# Patient Record
Sex: MALE | Race: WHITE | NOT HISPANIC OR LATINO
[De-identification: names, ages, dates, MRNs, and addresses within clinical notes are randomized per-mention and may not be internally consistent; named-entity substitution may affect disease eponyms.]

---

## 2023-03-30 ENCOUNTER — NON-APPOINTMENT (OUTPATIENT)
Age: 68
End: 2023-03-30

## 2023-04-04 ENCOUNTER — APPOINTMENT (OUTPATIENT)
Dept: DERMATOLOGY | Facility: CLINIC | Age: 68
End: 2023-04-04
Payer: MEDICARE

## 2023-04-04 ENCOUNTER — NON-APPOINTMENT (OUTPATIENT)
Age: 68
End: 2023-04-04

## 2023-04-04 DIAGNOSIS — L81.4 OTHER MELANIN HYPERPIGMENTATION: ICD-10-CM

## 2023-04-04 DIAGNOSIS — B00.9 HERPESVIRAL INFECTION, UNSPECIFIED: ICD-10-CM

## 2023-04-04 DIAGNOSIS — L82.1 OTHER SEBORRHEIC KERATOSIS: ICD-10-CM

## 2023-04-04 DIAGNOSIS — L82.0 INFLAMED SEBORRHEIC KERATOSIS: ICD-10-CM

## 2023-04-04 DIAGNOSIS — Z00.00 ENCOUNTER FOR GENERAL ADULT MEDICAL EXAMINATION W/OUT ABNORMAL FINDINGS: ICD-10-CM

## 2023-04-04 PROCEDURE — 99203 OFFICE O/P NEW LOW 30 MIN: CPT | Mod: 25

## 2023-04-04 PROCEDURE — 17110 DESTRUCTION B9 LES UP TO 14: CPT

## 2023-04-04 RX ORDER — VALACYCLOVIR 500 MG/1
500 TABLET, FILM COATED ORAL TWICE DAILY
Qty: 1 | Refills: 4 | Status: ACTIVE | COMMUNITY
Start: 2023-04-04 | End: 1900-01-01

## 2023-04-04 NOTE — HISTORY OF PRESENT ILLNESS
[FreeTextEntry1] : Patient presents for skin examination. [de-identified] : Notes irritated lesions of the right lower eyelid and left cheek.

## 2023-04-04 NOTE — PHYSICAL EXAM
[Alert] : alert [Oriented x 3] : ~L oriented x 3 [Well Nourished] : well nourished [Full Body Skin Exam Performed] : performed [FreeTextEntry3] : A full skin exam was performed including the scalp, face (including lips, ears, nose and eyes), neck, chest, abdomen, back, buttocks, upper extremities and lower extremities.  The patient declined examination of the genitalia.  \par The exam revealed the following benign growths:\par Seborrheic keratoses - numerous, expecially on the trunk.\par Lentigines - face.\par \par Greasy brown erythematous papules, right lower eyelid and left lateral cheek.\par \par Clustered vesicles on erythematous patch, left upper buttocks region.\par

## 2023-04-04 NOTE — ASSESSMENT
[FreeTextEntry1] : A complete skin examination was performed.  There is no evidence of skin cancer.  We discussed the importance of photoprotection, including the use of hats, protective clothing and sunscreens with an SPF of at least 30.  Sun avoidance was also discussed.  The ABCDE's of melanoma was discussed.  Regular skin exams recommended.\par \par H.simplex\par education.\par Valtrex 500mg bid x 3 days per flare.

## 2023-06-10 ENCOUNTER — INPATIENT (INPATIENT)
Facility: HOSPITAL | Age: 68
LOS: 2 days | Discharge: HOME | DRG: 247 | End: 2023-06-13
Attending: HOSPITALIST | Admitting: INTERNAL MEDICINE
Payer: MEDICARE

## 2023-06-10 VITALS — HEIGHT: 71 IN | WEIGHT: 169.98 LBS

## 2023-06-10 DIAGNOSIS — Z91.199 PATIENT'S NONCOMPLIANCE WITH OTHER MEDICAL TREATMENT AND REGIMEN DUE TO UNSPECIFIED REASON: ICD-10-CM

## 2023-06-10 DIAGNOSIS — I10 ESSENTIAL (PRIMARY) HYPERTENSION: ICD-10-CM

## 2023-06-10 DIAGNOSIS — I21.4 NON-ST ELEVATION (NSTEMI) MYOCARDIAL INFARCTION: ICD-10-CM

## 2023-06-10 DIAGNOSIS — I25.10 ATHEROSCLEROTIC HEART DISEASE OF NATIVE CORONARY ARTERY WITHOUT ANGINA PECTORIS: ICD-10-CM

## 2023-06-10 DIAGNOSIS — E78.5 HYPERLIPIDEMIA, UNSPECIFIED: ICD-10-CM

## 2023-06-10 DIAGNOSIS — R07.9 CHEST PAIN, UNSPECIFIED: ICD-10-CM

## 2023-06-10 LAB
ADD ON TEST-SPECIMEN IN LAB: SIGNIFICANT CHANGE UP
ALBUMIN SERPL ELPH-MCNC: 3.3 G/DL — SIGNIFICANT CHANGE UP (ref 3.3–5)
ALP SERPL-CCNC: 65 U/L — SIGNIFICANT CHANGE UP (ref 40–120)
ALT FLD-CCNC: 22 U/L — SIGNIFICANT CHANGE UP (ref 12–78)
ANION GAP SERPL CALC-SCNC: 4 MMOL/L — LOW (ref 5–17)
APTT BLD: 28.7 SEC — SIGNIFICANT CHANGE UP (ref 27.5–35.5)
APTT BLD: 57.8 SEC — HIGH (ref 27.5–35.5)
AST SERPL-CCNC: 13 U/L — LOW (ref 15–37)
BASOPHILS # BLD AUTO: 0.03 K/UL — SIGNIFICANT CHANGE UP (ref 0–0.2)
BASOPHILS NFR BLD AUTO: 0.7 % — SIGNIFICANT CHANGE UP (ref 0–2)
BILIRUB SERPL-MCNC: 0.4 MG/DL — SIGNIFICANT CHANGE UP (ref 0.2–1.2)
BUN SERPL-MCNC: 12 MG/DL — SIGNIFICANT CHANGE UP (ref 7–23)
CALCIUM SERPL-MCNC: 8.2 MG/DL — LOW (ref 8.5–10.1)
CHLORIDE SERPL-SCNC: 111 MMOL/L — HIGH (ref 96–108)
CO2 SERPL-SCNC: 27 MMOL/L — SIGNIFICANT CHANGE UP (ref 22–31)
CREAT SERPL-MCNC: 0.86 MG/DL — SIGNIFICANT CHANGE UP (ref 0.5–1.3)
EGFR: 94 ML/MIN/1.73M2 — SIGNIFICANT CHANGE UP
EOSINOPHIL # BLD AUTO: 0.08 K/UL — SIGNIFICANT CHANGE UP (ref 0–0.5)
EOSINOPHIL NFR BLD AUTO: 1.8 % — SIGNIFICANT CHANGE UP (ref 0–6)
GLUCOSE SERPL-MCNC: 96 MG/DL — SIGNIFICANT CHANGE UP (ref 70–99)
HCT VFR BLD CALC: 38.1 % — LOW (ref 39–50)
HCT VFR BLD CALC: 38.5 % — LOW (ref 39–50)
HGB BLD-MCNC: 12.8 G/DL — LOW (ref 13–17)
HGB BLD-MCNC: 13.3 G/DL — SIGNIFICANT CHANGE UP (ref 13–17)
IMM GRANULOCYTES NFR BLD AUTO: 0.2 % — SIGNIFICANT CHANGE UP (ref 0–0.9)
INR BLD: 1 RATIO — SIGNIFICANT CHANGE UP (ref 0.88–1.16)
LYMPHOCYTES # BLD AUTO: 1.49 K/UL — SIGNIFICANT CHANGE UP (ref 1–3.3)
LYMPHOCYTES # BLD AUTO: 33.9 % — SIGNIFICANT CHANGE UP (ref 13–44)
MCHC RBC-ENTMCNC: 33.1 PG — SIGNIFICANT CHANGE UP (ref 27–34)
MCHC RBC-ENTMCNC: 33.6 GM/DL — SIGNIFICANT CHANGE UP (ref 32–36)
MCHC RBC-ENTMCNC: 33.8 PG — SIGNIFICANT CHANGE UP (ref 27–34)
MCHC RBC-ENTMCNC: 34.5 GM/DL — SIGNIFICANT CHANGE UP (ref 32–36)
MCV RBC AUTO: 98 FL — SIGNIFICANT CHANGE UP (ref 80–100)
MCV RBC AUTO: 98.4 FL — SIGNIFICANT CHANGE UP (ref 80–100)
MONOCYTES # BLD AUTO: 0.42 K/UL — SIGNIFICANT CHANGE UP (ref 0–0.9)
MONOCYTES NFR BLD AUTO: 9.6 % — SIGNIFICANT CHANGE UP (ref 2–14)
NEUTROPHILS # BLD AUTO: 2.36 K/UL — SIGNIFICANT CHANGE UP (ref 1.8–7.4)
NEUTROPHILS NFR BLD AUTO: 53.8 % — SIGNIFICANT CHANGE UP (ref 43–77)
NT-PROBNP SERPL-SCNC: 97 PG/ML — SIGNIFICANT CHANGE UP (ref 0–125)
PLATELET # BLD AUTO: 223 K/UL — SIGNIFICANT CHANGE UP (ref 150–400)
PLATELET # BLD AUTO: 232 K/UL — SIGNIFICANT CHANGE UP (ref 150–400)
POTASSIUM SERPL-MCNC: 3.9 MMOL/L — SIGNIFICANT CHANGE UP (ref 3.5–5.3)
POTASSIUM SERPL-SCNC: 3.9 MMOL/L — SIGNIFICANT CHANGE UP (ref 3.5–5.3)
PROT SERPL-MCNC: 6.9 GM/DL — SIGNIFICANT CHANGE UP (ref 6–8.3)
PROTHROM AB SERPL-ACNC: 11.6 SEC — SIGNIFICANT CHANGE UP (ref 10.5–13.4)
RBC # BLD: 3.87 M/UL — LOW (ref 4.2–5.8)
RBC # BLD: 3.93 M/UL — LOW (ref 4.2–5.8)
RBC # FLD: 12 % — SIGNIFICANT CHANGE UP (ref 10.3–14.5)
RBC # FLD: 12 % — SIGNIFICANT CHANGE UP (ref 10.3–14.5)
SODIUM SERPL-SCNC: 142 MMOL/L — SIGNIFICANT CHANGE UP (ref 135–145)
TROPONIN I, HIGH SENSITIVITY RESULT: 202.24 NG/L — HIGH
TROPONIN I, HIGH SENSITIVITY RESULT: 249.46 NG/L — HIGH
TROPONIN I, HIGH SENSITIVITY RESULT: 271.4 NG/L — HIGH
WBC # BLD: 4.39 K/UL — SIGNIFICANT CHANGE UP (ref 3.8–10.5)
WBC # BLD: 4.82 K/UL — SIGNIFICANT CHANGE UP (ref 3.8–10.5)
WBC # FLD AUTO: 4.39 K/UL — SIGNIFICANT CHANGE UP (ref 3.8–10.5)
WBC # FLD AUTO: 4.82 K/UL — SIGNIFICANT CHANGE UP (ref 3.8–10.5)

## 2023-06-10 PROCEDURE — C1874: CPT

## 2023-06-10 PROCEDURE — 93005 ELECTROCARDIOGRAM TRACING: CPT

## 2023-06-10 PROCEDURE — A9500: CPT

## 2023-06-10 PROCEDURE — 93306 TTE W/DOPPLER COMPLETE: CPT

## 2023-06-10 PROCEDURE — 80048 BASIC METABOLIC PNL TOTAL CA: CPT

## 2023-06-10 PROCEDURE — 78452 HT MUSCLE IMAGE SPECT MULT: CPT

## 2023-06-10 PROCEDURE — C1725: CPT

## 2023-06-10 PROCEDURE — C1760: CPT

## 2023-06-10 PROCEDURE — C1769: CPT

## 2023-06-10 PROCEDURE — 93010 ELECTROCARDIOGRAM REPORT: CPT

## 2023-06-10 PROCEDURE — 93458 L HRT ARTERY/VENTRICLE ANGIO: CPT | Mod: 59

## 2023-06-10 PROCEDURE — 86850 RBC ANTIBODY SCREEN: CPT

## 2023-06-10 PROCEDURE — C1894: CPT

## 2023-06-10 PROCEDURE — 93306 TTE W/DOPPLER COMPLETE: CPT | Mod: 26

## 2023-06-10 PROCEDURE — 85730 THROMBOPLASTIN TIME PARTIAL: CPT

## 2023-06-10 PROCEDURE — 86900 BLOOD TYPING SEROLOGIC ABO: CPT

## 2023-06-10 PROCEDURE — 84484 ASSAY OF TROPONIN QUANT: CPT

## 2023-06-10 PROCEDURE — C9600: CPT | Mod: LM

## 2023-06-10 PROCEDURE — 83036 HEMOGLOBIN GLYCOSYLATED A1C: CPT

## 2023-06-10 PROCEDURE — 99223 1ST HOSP IP/OBS HIGH 75: CPT

## 2023-06-10 PROCEDURE — C1887: CPT

## 2023-06-10 PROCEDURE — 80061 LIPID PANEL: CPT

## 2023-06-10 PROCEDURE — 86803 HEPATITIS C AB TEST: CPT

## 2023-06-10 PROCEDURE — 86901 BLOOD TYPING SEROLOGIC RH(D): CPT

## 2023-06-10 PROCEDURE — 99285 EMERGENCY DEPT VISIT HI MDM: CPT

## 2023-06-10 PROCEDURE — 93017 CV STRESS TEST TRACING ONLY: CPT

## 2023-06-10 PROCEDURE — 85027 COMPLETE CBC AUTOMATED: CPT

## 2023-06-10 PROCEDURE — 71045 X-RAY EXAM CHEST 1 VIEW: CPT | Mod: 26

## 2023-06-10 PROCEDURE — 85025 COMPLETE CBC W/AUTO DIFF WBC: CPT

## 2023-06-10 PROCEDURE — 36415 COLL VENOUS BLD VENIPUNCTURE: CPT

## 2023-06-10 RX ORDER — ASPIRIN/CALCIUM CARB/MAGNESIUM 324 MG
81 TABLET ORAL DAILY
Refills: 0 | Status: DISCONTINUED | OUTPATIENT
Start: 2023-06-11 | End: 2023-06-13

## 2023-06-10 RX ORDER — ACETAMINOPHEN 500 MG
650 TABLET ORAL EVERY 6 HOURS
Refills: 0 | Status: DISCONTINUED | OUTPATIENT
Start: 2023-06-10 | End: 2023-06-13

## 2023-06-10 RX ORDER — HEPARIN SODIUM 5000 [USP'U]/ML
4700 INJECTION INTRAVENOUS; SUBCUTANEOUS EVERY 6 HOURS
Refills: 0 | Status: DISCONTINUED | OUTPATIENT
Start: 2023-06-10 | End: 2023-06-12

## 2023-06-10 RX ORDER — HEPARIN SODIUM 5000 [USP'U]/ML
INJECTION INTRAVENOUS; SUBCUTANEOUS
Qty: 25000 | Refills: 0 | Status: DISCONTINUED | OUTPATIENT
Start: 2023-06-10 | End: 2023-06-12

## 2023-06-10 RX ORDER — LANOLIN ALCOHOL/MO/W.PET/CERES
3 CREAM (GRAM) TOPICAL AT BEDTIME
Refills: 0 | Status: DISCONTINUED | OUTPATIENT
Start: 2023-06-10 | End: 2023-06-13

## 2023-06-10 RX ORDER — HEPARIN SODIUM 5000 [USP'U]/ML
4700 INJECTION INTRAVENOUS; SUBCUTANEOUS ONCE
Refills: 0 | Status: COMPLETED | OUTPATIENT
Start: 2023-06-10 | End: 2023-06-10

## 2023-06-10 RX ORDER — ONDANSETRON 8 MG/1
4 TABLET, FILM COATED ORAL EVERY 8 HOURS
Refills: 0 | Status: DISCONTINUED | OUTPATIENT
Start: 2023-06-10 | End: 2023-06-13

## 2023-06-10 RX ORDER — ENOXAPARIN SODIUM 100 MG/ML
40 INJECTION SUBCUTANEOUS EVERY 24 HOURS
Refills: 0 | Status: DISCONTINUED | OUTPATIENT
Start: 2023-06-10 | End: 2023-06-10

## 2023-06-10 RX ORDER — ATORVASTATIN CALCIUM 80 MG/1
10 TABLET, FILM COATED ORAL AT BEDTIME
Refills: 0 | Status: DISCONTINUED | OUTPATIENT
Start: 2023-06-10 | End: 2023-06-11

## 2023-06-10 RX ORDER — ASPIRIN/CALCIUM CARB/MAGNESIUM 324 MG
324 TABLET ORAL ONCE
Refills: 0 | Status: COMPLETED | OUTPATIENT
Start: 2023-06-10 | End: 2023-06-10

## 2023-06-10 RX ADMIN — HEPARIN SODIUM 950 UNIT(S)/HR: 5000 INJECTION INTRAVENOUS; SUBCUTANEOUS at 12:01

## 2023-06-10 RX ADMIN — ATORVASTATIN CALCIUM 10 MILLIGRAM(S): 80 TABLET, FILM COATED ORAL at 20:46

## 2023-06-10 RX ADMIN — Medication 324 MILLIGRAM(S): at 09:32

## 2023-06-10 RX ADMIN — HEPARIN SODIUM 4700 UNIT(S): 5000 INJECTION INTRAVENOUS; SUBCUTANEOUS at 12:04

## 2023-06-10 RX ADMIN — HEPARIN SODIUM 950 UNIT(S)/HR: 5000 INJECTION INTRAVENOUS; SUBCUTANEOUS at 19:28

## 2023-06-10 RX ADMIN — HEPARIN SODIUM 950 UNIT(S)/HR: 5000 INJECTION INTRAVENOUS; SUBCUTANEOUS at 19:22

## 2023-06-10 NOTE — ED ADULT NURSE NOTE - OBJECTIVE STATEMENT
Pt presented to the ER with c/o SOB and CP. Pt stated that the symptoms started 2 weeks ago. Per pt the symptoms worsened today which is why he came to the ER. Pt stated that he feels an increase SOB while walking up and down the stairs. Pt is also c/o midsternal CP.

## 2023-06-10 NOTE — ED ADULT NURSE NOTE - BIRTH SEX
well developed, well nourished , in no acute distress , ambulating without difficulty , normal communication ability
Male

## 2023-06-10 NOTE — ED PROVIDER NOTE - CLINICAL SUMMARY MEDICAL DECISION MAKING FREE TEXT BOX
67-year-old male presents to the ED with chest pain.  Concern for unstable angina/ACS.  No signs or concern for dissection PE pneumonia pneumothorax.  Patient with heart score 4 secondary to highly concerning story of exertional chest pain now at rest radiating to the jaw.  We will touch base with cardiology likely will need admission 67-year-old male presents to the ED with chest pain.  Concern for unstable angina/ACS.  No signs or concern for dissection PE pneumonia pneumothorax.  Patient with heart score 4 secondary to highly concerning story of exertional chest pain now at rest radiating to the jaw.  We will touch base with cardiology likely will need admission    9:41a - Trop 202.24, heart score now 5, call put out for cardiology, pt to be admitted for further cardiac workup. - Kaelyn Mason PA-C 67-year-old male presents to the ED with chest pain.  Concern for unstable angina/ACS.  No signs or concern for dissection PE pneumonia pneumothorax.  Patient with heart score 4 secondary to highly concerning story of exertional chest pain now at rest radiating to the jaw.  We will touch base with cardiology likely will need admission    9:41a - Trop 202.24, heart score now 5, call put out for cardiology, pt to be admitted for further cardiac workup. - MAHENDRA Moscoso-C    211 spoke with Dr. Ojeda covering for Dr. Smtih will see patient

## 2023-06-10 NOTE — H&P ADULT - HISTORY OF PRESENT ILLNESS
68 YOM, healthy, no med problems neg fam hx comes c/o CPx1-2 weeks. He is been experiencing CP mostly effort related and thought the pain could be related to his " pinched nerve" and plus he was told he has a great heart and gool LDL ( I looked at ht his labs LDL 1 year ago was 154). He doesn't smoke; this am he woke up and went to use bathroom at 0400, upon returning he started having CP so he decided it was time to come to ED. He has a systolic murmur 2 right intercostal space III/VI. Admit for UA r/o AS.

## 2023-06-10 NOTE — ED PROVIDER NOTE - PHYSICAL EXAMINATION
Constitutional: NAD AAOx3  Eyes: PERRLA EOMI  Head: Normocephalic atraumatic  Mouth: MMM  Cardiac: regular rate normal peripheral pulses no LE swelling  Resp: unlabored breathing clear b/l   GI: Abd s/nt/nd  Neuro: grossly normal and intact  Skin: No visible rashes

## 2023-06-10 NOTE — H&P ADULT - ASSESSMENT
* Unstable angina  start anticoagulation with UFH  trend trops  check LDL  no BB, HR in the 60s  systolic murmur r/o AS

## 2023-06-10 NOTE — ED ADULT TRIAGE NOTE - CHIEF COMPLAINT QUOTE
Pt presented to the ER with c/o SOB and CP. Pt stated that the symptoms started 2 weeks ago. Per pt the symptoms worsened today which is why he came to the ER. Pt stated that he feels an increase SOB while walking up and down the stairs. Pt is also c/o midsternal CP. Pt denies any fevers, dizziness, or cough.

## 2023-06-10 NOTE — ED PROVIDER NOTE - OBJECTIVE STATEMENT
67-year-old male with no known medical history presents the emergency department for chest pressure.  Patient states for the last 2 weeks he has been having exertional chest pressure.  States that he never used to have this but for 2 weeks anytime that he exerts himself he gets pressure in the center of his chest.  Today he woke up and he had pressure at rest lasted for 15 minutes radiating up to the jaw.  Patient states that he felt he needed to come to the hospital.  Patient set up an appointment with cardiologist Dr. Salcedo however  did not see him yet.  Never had a stress test no family history of heart attack.  No leg swelling recent travel or recent surgeries.  Contrary to the triage note patient does not have shortness of breath.  No fevers cough or other symptoms.

## 2023-06-10 NOTE — H&P ADULT - NSHPLABSRESULTS_GEN_ALL_CORE
12.8   4.39  )-----------( 223      ( 10 Mata 2023 08:44 )             38.1   06-10    142  |  111<H>  |  12  ----------------------------<  96  3.9   |  27  |  0.86    Ca    8.2<L>      10 Mata 2023 08:44    TPro  6.9  /  Alb  3.3  /  TBili  0.4  /  DBili  x   /  AST  13<L>  /  ALT  22  /  AlkPhos  65  06-10      ekg NSR, old septal    cxr no infiltrates nor effusion

## 2023-06-11 LAB
A1C WITH ESTIMATED AVERAGE GLUCOSE RESULT: 5.7 % — HIGH (ref 4–5.6)
APTT BLD: 49.3 SEC — HIGH (ref 27.5–35.5)
APTT BLD: 52.5 SEC — HIGH (ref 27.5–35.5)
APTT BLD: 59 SEC — HIGH (ref 27.5–35.5)
CHOLEST SERPL-MCNC: 196 MG/DL — SIGNIFICANT CHANGE UP
ESTIMATED AVERAGE GLUCOSE: 117 MG/DL — HIGH (ref 68–114)
HCT VFR BLD CALC: 38.1 % — LOW (ref 39–50)
HCV AB S/CO SERPL IA: 0.13 S/CO — SIGNIFICANT CHANGE UP (ref 0–0.99)
HCV AB SERPL-IMP: SIGNIFICANT CHANGE UP
HDLC SERPL-MCNC: 48 MG/DL — SIGNIFICANT CHANGE UP
HGB BLD-MCNC: 13 G/DL — SIGNIFICANT CHANGE UP (ref 13–17)
LIPID PNL WITH DIRECT LDL SERPL: 131 MG/DL — HIGH
MCHC RBC-ENTMCNC: 33.1 PG — SIGNIFICANT CHANGE UP (ref 27–34)
MCHC RBC-ENTMCNC: 34.1 GM/DL — SIGNIFICANT CHANGE UP (ref 32–36)
MCV RBC AUTO: 96.9 FL — SIGNIFICANT CHANGE UP (ref 80–100)
NON HDL CHOLESTEROL: 148 MG/DL — HIGH
PLATELET # BLD AUTO: 214 K/UL — SIGNIFICANT CHANGE UP (ref 150–400)
RBC # BLD: 3.93 M/UL — LOW (ref 4.2–5.8)
RBC # FLD: 11.9 % — SIGNIFICANT CHANGE UP (ref 10.3–14.5)
TRIGL SERPL-MCNC: 87 MG/DL — SIGNIFICANT CHANGE UP
WBC # BLD: 4.36 K/UL — SIGNIFICANT CHANGE UP (ref 3.8–10.5)
WBC # FLD AUTO: 4.36 K/UL — SIGNIFICANT CHANGE UP (ref 3.8–10.5)

## 2023-06-11 PROCEDURE — 99233 SBSQ HOSP IP/OBS HIGH 50: CPT

## 2023-06-11 RX ORDER — ATORVASTATIN CALCIUM 80 MG/1
40 TABLET, FILM COATED ORAL AT BEDTIME
Refills: 0 | Status: DISCONTINUED | OUTPATIENT
Start: 2023-06-11 | End: 2023-06-13

## 2023-06-11 RX ADMIN — HEPARIN SODIUM 1100 UNIT(S)/HR: 5000 INJECTION INTRAVENOUS; SUBCUTANEOUS at 07:21

## 2023-06-11 RX ADMIN — ATORVASTATIN CALCIUM 40 MILLIGRAM(S): 80 TABLET, FILM COATED ORAL at 20:35

## 2023-06-11 RX ADMIN — HEPARIN SODIUM 1100 UNIT(S)/HR: 5000 INJECTION INTRAVENOUS; SUBCUTANEOUS at 14:20

## 2023-06-11 RX ADMIN — HEPARIN SODIUM 1100 UNIT(S)/HR: 5000 INJECTION INTRAVENOUS; SUBCUTANEOUS at 03:07

## 2023-06-11 RX ADMIN — HEPARIN SODIUM 1100 UNIT(S)/HR: 5000 INJECTION INTRAVENOUS; SUBCUTANEOUS at 09:27

## 2023-06-11 RX ADMIN — HEPARIN SODIUM 1100 UNIT(S)/HR: 5000 INJECTION INTRAVENOUS; SUBCUTANEOUS at 16:10

## 2023-06-11 RX ADMIN — Medication 81 MILLIGRAM(S): at 09:31

## 2023-06-11 RX ADMIN — HEPARIN SODIUM 1100 UNIT(S)/HR: 5000 INJECTION INTRAVENOUS; SUBCUTANEOUS at 19:08

## 2023-06-11 NOTE — PROGRESS NOTE ADULT - SUBJECTIVE AND OBJECTIVE BOX
Chief Complaint: Chest discomfort    Interval Hx: Patient reports that he thinks it's possibly his chest pain could be from a pinched nerve. He has no other associated symptoms. No dyspnea. No dizziness, lightheadedness or palpitations. No sweats. No nausea. No other complaints. Troponins slightly elevated though now downtrending. No acute events on tele. Ordered for stress test for tomorrow AM as per Dr. Hirsch, Cardiology. Remains on IV heparin drip as per Dr. Hirsch.     ROS: Multi system review is comprehensively negative x 10 systems except as above    Vitals:  T(F): 97.2 (11 Jun 2023 07:00), Max: 97.9 (10 Mata 2023 20:51)  HR: 57 (11 Jun 2023 07:00) (57 - 67)  BP: 124/64 (11 Jun 2023 07:00) (124/64 - 134/68)  RR: 19 (11 Jun 2023 07:00) (18 - 19)  SpO2: 97% (11 Jun 2023 07:00) (95% - 97%) on room air    Exam:  Gen: No acute distress  HEENT: NCAT PERRL EOMI MMM  Neck: Supple, no JVD, no LAD, no thyromegaly, no C spine tenderness  Chest: Normal resp effort, lungs CTA B/L  CVS: s1 s2 normal, RRR  Abd: +BS, soft NT ND  Ext: No edema or calf tenderness  Skin: Warm, dry  Neuro: A+Ox3, no gross deficits  Mood: Calm, pleasant    Labs:               13.0   4.36 )-----------( 214             38.1       142  |  111  |  12  -----------------------<  96  3.9   |   27   |  0.86    Ca 8.2    TPro  6.9  /  Alb  3.3  /  TBili  0.4  /  DBili  x   /  AST  13  /  ALT  22  /  AlkPhos  65      PT: 11.6 sec;   INR: 1.00 ratio  PTT: 52.5 sec    Troponin 202 --> 271 --> 249   proBNP 97    A1c 5.7      Imaging:  CXR 6/10: The lungs are clear without discrete infiltrate.  There is no pulmonary vascular congestion.  There are no pleural effusions. The heart and mediastinal contours are within normal limits.  The trachea is midline. The osseous structures are intact.    Cardiac Testing:  Tele 6/11: SR, rate WNL, no events, no ectopy    TTE 6/10:  The left ventricle is normal in size, wall thickness, wall motion and contractility as seen in limited views. Estimated left ventricular ejection fraction is 60-65 %. Mild (1+) mitral regurgitation. Mild (1+) tricuspid valve regurgitation. Mild dilatation of the aortic arch.    EKG 6/10: Rate 70, NSR, questionable tiny q in V4-V6    Meds:  MEDICATIONS  (STANDING):  aspirin  chewable 81 milliGRAM(s) Oral daily  atorvastatin 40 milliGRAM(s) Oral at bedtime  heparin  Infusion.  Unit(s)/Hr (9.5 mL/Hr) IV Continuous <Continuous>    MEDICATIONS  (PRN):  acetaminophen     Tablet .. 650 milliGRAM(s) Oral every 6 hours PRN Temp greater or equal to 38C (100.4F), Mild Pain (1 - 3)  aluminum hydroxide/magnesium hydroxide/simethicone Suspension 30 milliLiter(s) Oral every 4 hours PRN Dyspepsia  heparin   Injectable 4700 Unit(s) IV Push every 6 hours PRN For aPTT less than 40  melatonin 3 milliGRAM(s) Oral at bedtime PRN Insomnia  ondansetron Injectable 4 milliGRAM(s) IV Push every 8 hours PRN Nausea and/or Vomiting

## 2023-06-11 NOTE — CONSULT NOTE ADULT - ASSESSMENT
68 YOM, healthy, no med problems neg fam hx comes c/o CPx1-2 weeks. He is been experiencing CP mostly effort related and thought the pain could be related to his " pinched nerve" and plus he was told he has a great heart and gool LDL ( I looked at ht his labs LDL 1 year ago was 154). He doesn't smoke; this am he woke up and went to use bathroom at 0400, upon returning he started having CP so he decided it was time to come to ED.     6/11/23  Troponins are slightly elevated and trending downward.   recommend a stress test in am. If normal, then discharge home for further work up of his cervical pinched nerve.

## 2023-06-11 NOTE — CONSULT NOTE ADULT - SUBJECTIVE AND OBJECTIVE BOX
PCP:  68 YOM, healthy, no med problems neg fam hx comes c/o CPx1-2 weeks. He is been experiencing CP mostly effort related and thought the pain could be related to his " pinched nerve" and plus he was told he has a great heart and gool LDL ( I looked at ht his labs LDL 1 year ago was 154). He doesn't smoke; this am he woke up and went to use bathroom at 0400, upon returning he started having CP so he decided it was time to come to ED. He has a systolic murmur 2 right intercostal space III/VI. Admit for UA r/o AS. (10 Mata 2023 10:35)    6/11/23  Admitted with chest pain. Not sure if it is related to the pinched nerve in the left side of the neck.   Has never had cardiac studies.   No family history of heart disease.       ECHO:    The left ventricle is normal in size, wall thickness, wall motion and   contractility as seen in limited views.   Estimated left ventricular ejection fraction is 60-65 %.   Mild (1+) mitral regurgitation.   Mild (1+) tricuspid valve regurgitation.   Mild dilatation of the aortic arch.    PAST MEDICAL & SURGICAL HISTORY:  No pertinent past medical history        MEDICATIONS  (STANDING):  aspirin  chewable 81 milliGRAM(s) Oral daily  atorvastatin 40 milliGRAM(s) Oral at bedtime  heparin  Infusion.  Unit(s)/Hr (9.5 mL/Hr) IV Continuous <Continuous>    MEDICATIONS  (PRN):  acetaminophen     Tablet .. 650 milliGRAM(s) Oral every 6 hours PRN Temp greater or equal to 38C (100.4F), Mild Pain (1 - 3)  aluminum hydroxide/magnesium hydroxide/simethicone Suspension 30 milliLiter(s) Oral every 4 hours PRN Dyspepsia  heparin   Injectable 4700 Unit(s) IV Push every 6 hours PRN For aPTT less than 40  melatonin 3 milliGRAM(s) Oral at bedtime PRN Insomnia  ondansetron Injectable 4 milliGRAM(s) IV Push every 8 hours PRN Nausea and/or Vomiting    Allergies    No Known Allergies    Intolerances      FAMILY HISTORY:        REVIEW OF SYSTEMS:    CONSTITUTIONAL: No weakness, fevers or chills  EYES/ENT: No visual changes;  No vertigo or throat pain   NECK: No pain or stiffness  RESPIRATORY: No cough, wheezing, hemoptysis; No shortness of breath  CARDIOVASCULAR: No chest pain or palpitations  GASTROINTESTINAL: No abdominal or epigastric pain. No nausea, vomiting, or hematemesis; No diarrhea or constipation. No melena or hematochezia.  GENITOURINARY: No dysuria, frequency or hematuria  NEUROLOGICAL: No numbness or weakness  SKIN: No itching, burning, rashes, or lesions   All other review of systems is negative unless indicated above      PHYSICAL EXAM:  Daily     Daily   Vital Signs Last 24 Hrs  T(C): 36.2 (11 Jun 2023 07:00), Max: 36.7 (10 Mata 2023 14:00)  T(F): 97.2 (11 Jun 2023 07:00), Max: 98 (10 Mata 2023 14:00)  HR: 57 (11 Jun 2023 07:00) (57 - 68)  BP: 124/64 (11 Jun 2023 07:00) (124/64 - 134/68)  BP(mean): --  RR: 19 (11 Jun 2023 07:00) (16 - 19)  SpO2: 97% (11 Jun 2023 07:00) (95% - 99%)    Parameters below as of 11 Jun 2023 07:00  Patient On (Oxygen Delivery Method): room air        Constitutional: NAD, awake and alert, well-developed  HEENT: PERR, EOMI, Normal Hearing, MMM  Neck: Soft and supple, No LAD, No JVD  Respiratory: Breath sounds are clear bilaterally, No wheezing, rales or rhonchi  Cardiovascular: S1 and S2, regular rate and rhythm, no Murmurs, gallops or rubs  Gastrointestinal: Bowel Sounds present, soft, nontender, nondistended, no guarding, no rebound  Extremities: No peripheral edema  Vascular: 2+ peripheral pulses  Neurological: A/O x 3, no focal deficits  Musculoskeletal: 5/5 strength b/l upper and lower extremities  Skin: No rashes    LABS: All Labs Reviewed:                        13.0   4.36  )-----------( 214      ( 11 Jun 2023 07:07 )             38.1     06-10    142  |  111<H>  |  12  ----------------------------<  96  3.9   |  27  |  0.86    Ca    8.2<L>      10 Mata 2023 08:44    TPro  6.9  /  Alb  3.3  /  TBili  0.4  /  DBili  x   /  AST  13<L>  /  ALT  22  /  AlkPhos  65  06-10    PT/INR - ( 10 Mata 2023 08:53 )   PT: 11.6 sec;   INR: 1.00 ratio         PTT - ( 11 Jun 2023 07:07 )  PTT:52.5 sec      Blood Culture:     RADIOLOGY: < from: Xray Chest 1 View- PORTABLE-Urgent (06.10.23 @ 08:53) >  ACC: [id number] EXAM:  XR CHEST PORTABLE URGENT 1V   ORDERED BY: NEETA HUERTA     PROCEDURE DATE:  06/10/2023          INTERPRETATION:  CLINICAL HISTORY: Chest pain.    TECHNIQUE: 2 frontal views of the chest.    COMPARISON: No priors for comparison.    COMMENT:  There are no vascular or support catheters within the image.    The lungs are clear without discrete infiltrate.  There is no pulmonary   vascular congestion.  There are no pleural effusions.    The heart and mediastinal contours are within normal limits.  The trachea   is midline.    The osseous structures are intact.    IMPRESSION:  No acute pulmonary pathology.    < end of copied text >    EKG: NSR, No ischemic changes

## 2023-06-12 LAB
ANION GAP SERPL CALC-SCNC: 2 MMOL/L — LOW (ref 5–17)
APTT BLD: 57 SEC — HIGH (ref 27.5–35.5)
BASOPHILS # BLD AUTO: 0.04 K/UL — SIGNIFICANT CHANGE UP (ref 0–0.2)
BASOPHILS NFR BLD AUTO: 0.9 % — SIGNIFICANT CHANGE UP (ref 0–2)
BLD GP AB SCN SERPL QL: SIGNIFICANT CHANGE UP
BUN SERPL-MCNC: 14 MG/DL — SIGNIFICANT CHANGE UP (ref 7–23)
CALCIUM SERPL-MCNC: 8.5 MG/DL — SIGNIFICANT CHANGE UP (ref 8.5–10.1)
CHLORIDE SERPL-SCNC: 109 MMOL/L — HIGH (ref 96–108)
CO2 SERPL-SCNC: 29 MMOL/L — SIGNIFICANT CHANGE UP (ref 22–31)
CREAT SERPL-MCNC: 0.96 MG/DL — SIGNIFICANT CHANGE UP (ref 0.5–1.3)
EGFR: 86 ML/MIN/1.73M2 — SIGNIFICANT CHANGE UP
EOSINOPHIL # BLD AUTO: 0.12 K/UL — SIGNIFICANT CHANGE UP (ref 0–0.5)
EOSINOPHIL NFR BLD AUTO: 2.8 % — SIGNIFICANT CHANGE UP (ref 0–6)
GLUCOSE SERPL-MCNC: 110 MG/DL — HIGH (ref 70–99)
HCT VFR BLD CALC: 39.2 % — SIGNIFICANT CHANGE UP (ref 39–50)
HGB BLD-MCNC: 13.4 G/DL — SIGNIFICANT CHANGE UP (ref 13–17)
IMM GRANULOCYTES NFR BLD AUTO: 0 % — SIGNIFICANT CHANGE UP (ref 0–0.9)
LYMPHOCYTES # BLD AUTO: 1.71 K/UL — SIGNIFICANT CHANGE UP (ref 1–3.3)
LYMPHOCYTES # BLD AUTO: 39.5 % — SIGNIFICANT CHANGE UP (ref 13–44)
MCHC RBC-ENTMCNC: 33 PG — SIGNIFICANT CHANGE UP (ref 27–34)
MCHC RBC-ENTMCNC: 34.2 GM/DL — SIGNIFICANT CHANGE UP (ref 32–36)
MCV RBC AUTO: 96.6 FL — SIGNIFICANT CHANGE UP (ref 80–100)
MONOCYTES # BLD AUTO: 0.51 K/UL — SIGNIFICANT CHANGE UP (ref 0–0.9)
MONOCYTES NFR BLD AUTO: 11.8 % — SIGNIFICANT CHANGE UP (ref 2–14)
NEUTROPHILS # BLD AUTO: 1.95 K/UL — SIGNIFICANT CHANGE UP (ref 1.8–7.4)
NEUTROPHILS NFR BLD AUTO: 45 % — SIGNIFICANT CHANGE UP (ref 43–77)
PLATELET # BLD AUTO: 214 K/UL — SIGNIFICANT CHANGE UP (ref 150–400)
POTASSIUM SERPL-MCNC: 4.5 MMOL/L — SIGNIFICANT CHANGE UP (ref 3.5–5.3)
POTASSIUM SERPL-SCNC: 4.5 MMOL/L — SIGNIFICANT CHANGE UP (ref 3.5–5.3)
RBC # BLD: 4.06 M/UL — LOW (ref 4.2–5.8)
RBC # FLD: 11.9 % — SIGNIFICANT CHANGE UP (ref 10.3–14.5)
SODIUM SERPL-SCNC: 140 MMOL/L — SIGNIFICANT CHANGE UP (ref 135–145)
WBC # BLD: 4.33 K/UL — SIGNIFICANT CHANGE UP (ref 3.8–10.5)
WBC # FLD AUTO: 4.33 K/UL — SIGNIFICANT CHANGE UP (ref 3.8–10.5)

## 2023-06-12 PROCEDURE — 78452 HT MUSCLE IMAGE SPECT MULT: CPT | Mod: 26

## 2023-06-12 PROCEDURE — 93018 CV STRESS TEST I&R ONLY: CPT

## 2023-06-12 PROCEDURE — 99233 SBSQ HOSP IP/OBS HIGH 50: CPT

## 2023-06-12 PROCEDURE — 93016 CV STRESS TEST SUPVJ ONLY: CPT

## 2023-06-12 PROCEDURE — 93010 ELECTROCARDIOGRAM REPORT: CPT

## 2023-06-12 RX ORDER — LISINOPRIL 2.5 MG/1
5 TABLET ORAL DAILY
Refills: 0 | Status: DISCONTINUED | OUTPATIENT
Start: 2023-06-12 | End: 2023-06-13

## 2023-06-12 RX ORDER — CLOPIDOGREL BISULFATE 75 MG/1
75 TABLET, FILM COATED ORAL DAILY
Refills: 0 | Status: DISCONTINUED | OUTPATIENT
Start: 2023-06-13 | End: 2023-06-13

## 2023-06-12 RX ORDER — METOPROLOL TARTRATE 50 MG
50 TABLET ORAL DAILY
Refills: 0 | Status: DISCONTINUED | OUTPATIENT
Start: 2023-06-12 | End: 2023-06-13

## 2023-06-12 RX ORDER — SODIUM CHLORIDE 9 MG/ML
1000 INJECTION INTRAMUSCULAR; INTRAVENOUS; SUBCUTANEOUS
Refills: 0 | Status: DISCONTINUED | OUTPATIENT
Start: 2023-06-12 | End: 2023-06-13

## 2023-06-12 RX ORDER — SODIUM CHLORIDE 9 MG/ML
250 INJECTION INTRAMUSCULAR; INTRAVENOUS; SUBCUTANEOUS ONCE
Refills: 0 | Status: COMPLETED | OUTPATIENT
Start: 2023-06-12 | End: 2023-06-12

## 2023-06-12 RX ADMIN — Medication 81 MILLIGRAM(S): at 10:20

## 2023-06-12 RX ADMIN — ATORVASTATIN CALCIUM 40 MILLIGRAM(S): 80 TABLET, FILM COATED ORAL at 21:04

## 2023-06-12 RX ADMIN — SODIUM CHLORIDE 240 MILLILITER(S): 9 INJECTION INTRAMUSCULAR; INTRAVENOUS; SUBCUTANEOUS at 17:56

## 2023-06-12 RX ADMIN — HEPARIN SODIUM 1100 UNIT(S)/HR: 5000 INJECTION INTRAVENOUS; SUBCUTANEOUS at 07:15

## 2023-06-12 RX ADMIN — HEPARIN SODIUM 1100 UNIT(S)/HR: 5000 INJECTION INTRAVENOUS; SUBCUTANEOUS at 07:49

## 2023-06-12 RX ADMIN — Medication 50 MILLIGRAM(S): at 18:13

## 2023-06-12 RX ADMIN — SODIUM CHLORIDE 250 MILLILITER(S): 9 INJECTION INTRAMUSCULAR; INTRAVENOUS; SUBCUTANEOUS at 17:26

## 2023-06-12 NOTE — CHART NOTE - NSCHARTNOTEFT_GEN_A_CORE
Consent obtained for and signed for cardiac cath with coronary angiogram and possible stent placement with possible sedation and analgesia. [facility] risks, benefits and alternatives discussed with patient. Risk discussed included, but not limited to MI, stroke, mortality, major bleeding, arrythmia, or infection, educational material provided. Pt. verbalizes and understands pre-procedural instructions.   ASA: II  Bleeding Risk Score: 1.0  Creatinine: 0.96  GFR: 86  [place] Score: AMANDA 1 point   Pt. pre-hydrated with sodium chloride 0.9 250cc IV x1

## 2023-06-12 NOTE — PROGRESS NOTE ADULT - SUBJECTIVE AND OBJECTIVE BOX
Stress test pos    Cath later today     Full note to follow.     Thank you,  CARL Smith DO, FACC       68 YOM, healthy, no med problems neg fam hx comes c/o CPx1-2 weeks. He is been experiencing CP mostly effort related and thought the pain could be related to his " pinched nerve" and plus he was told he has a great heart and gool LDL ( I looked at ht his labs LDL 1 year ago was 154). He doesn't smoke; this am he woke up and went to use bathroom at 0400, upon returning he started having CP so he decided it was time to come to ED. He has a systolic murmur 2 right intercostal space III/VI. Admit for UA r/o AS. (10 Mata 2023 10:35)    6/11/23  Admitted with chest pain. Not sure if it is related to the pinched nerve in the left side of the neck.   Has never had cardiac studies.   No family history of heart disease.     6/12/2023  The patient was seen examined.  No acute events overnight.  No further chest discomfort  Positive stress test earlier today.  No events on telemetry.      ECHO:    The left ventricle is normal in size, wall thickness, wall motion and   contractility as seen in limited views.   Estimated left ventricular ejection fraction is 60-65 %.   Mild (1+) mitral regurgitation.   Mild (1+) tricuspid valve regurgitation.   Mild dilatation of the aortic arch.    PAST MEDICAL & SURGICAL HISTORY:  No pertinent past medical history    Review of systems: A 10 point review of system has been performed, and is negative except for what has been mentioned in the above history of present illness.     MEDICATIONS  (STANDING):  aspirin  chewable 81 milliGRAM(s) Oral daily  atorvastatin 40 milliGRAM(s) Oral at bedtime  heparin  Infusion.  Unit(s)/Hr (9.5 mL/Hr) IV Continuous <Continuous>    MEDICATIONS  (PRN):  acetaminophen     Tablet .. 650 milliGRAM(s) Oral every 6 hours PRN Temp greater or equal to 38C (100.4F), Mild Pain (1 - 3)  aluminum hydroxide/magnesium hydroxide/simethicone Suspension 30 milliLiter(s) Oral every 4 hours PRN Dyspepsia  heparin   Injectable 4700 Unit(s) IV Push every 6 hours PRN For aPTT less than 40  melatonin 3 milliGRAM(s) Oral at bedtime PRN Insomnia  ondansetron Injectable 4 milliGRAM(s) IV Push every 8 hours PRN Nausea and/or Vomiting      Vital Signs Last 24 Hrs  T(C): 36.4 (12 Jun 2023 10:46), Max: 37.1 (11 Jun 2023 21:06)  T(F): 97.5 (12 Jun 2023 10:46), Max: 98.8 (11 Jun 2023 21:06)  HR: 58 (12 Jun 2023 10:46) (58 - 67)  BP: 131/69 (12 Jun 2023 10:46) (131/69 - 133/67)  BP(mean): 84 (12 Jun 2023 10:46) (84 - 84)  RR: 18 (12 Jun 2023 10:46) (18 - 19)  SpO2: 96% (12 Jun 2023 10:46) (96% - 96%)    Parameters below as of 12 Jun 2023 10:46  Patient On (Oxygen Delivery Method): room air      I&O's Summary        Constitutional: NAD, awake and alert, well-developed  HEENT: PERR, EOMI, Normal Hearing, MMM  Neck: Soft and supple, No LAD, No JVD  Respiratory: Breath sounds are clear bilaterally, No wheezing, rales or rhonchi  Cardiovascular: S1 and S2, regular rate and rhythm, no Murmurs, gallops or rubs  Gastrointestinal: Bowel Sounds present, soft, nontender, nondistended, no guarding, no rebound  Extremities: No peripheral edema  Vascular: 2+ peripheral pulses  Neurological: A/O x 3, no focal deficits  Musculoskeletal: 5/5 strength b/l upper and lower extremities  Skin: No rashes    LABS: All Labs Reviewed:                                  13.4   4.33  )-----------( 214      ( 12 Jun 2023 07:19 )             39.2          06-12    140  |  109<H>  |  14  ----------------------------<  110<H>  4.5   |  29  |  0.96    Ca    8.5      12 Jun 2023 07:19          PTT - ( 12 Jun 2023 07:19 )  PTT:57.0 sec      TPro  6.9  /  Alb  3.3  /  TBili  0.4  /  DBili  x   /  AST  13<L>  /  ALT  22  /  AlkPhos  65  06-10    PT/INR - ( 10 Mata 2023 08:53 )   PT: 11.6 sec;   INR: 1.00 ratio         PTT - ( 11 Jun 2023 07:07 )  PTT:52.5 sec      Blood Culture:     RADIOLOGY: < from: Xray Chest 1 View- PORTABLE-Urgent (06.10.23 @ 08:53) >  ACC: [id number] EXAM:  XR CHEST PORTABLE URGENT 1V   ORDERED BY: NEETA HUERTA     PROCEDURE DATE:  06/10/2023          INTERPRETATION:  CLINICAL HISTORY: Chest pain.    TECHNIQUE: 2 frontal views of the chest.    COMPARISON: No priors for comparison.    COMMENT:  There are no vascular or support catheters within the image.    The lungs are clear without discrete infiltrate.  There is no pulmonary   vascular congestion.  There are no pleural effusions.    The heart and mediastinal contours are within normal limits.  The trachea   is midline.    The osseous structures are intact.    IMPRESSION:  No acute pulmonary pathology.    < end of copied text >    EKG: NSR, No ischemic changes

## 2023-06-12 NOTE — CHART NOTE - NSCHARTNOTEFT_GEN_A_CORE
Nurse Practitioner Progress note:     HPI:  68 YOM, healthy, no med problems neg fam hx comes c/o CPx1-2 weeks. He is been experiencing CP mostly effort related and thought the pain could be related to his " pinched nerve" and plus he was told he has a great heart and good LDL ( I looked at ht his labs LDL 1 year ago was 154). He doesn't smoke; this am he woke up and went to use bathroom at 0400, upon returning he started having CP so he decided it was time to come to ED. He has a systolic murmur 2 right intercostal space III/VI. Admit for UA r/o AS.   Pt. was scheduled to have a stress test during stress test pt. developed chest pain at the 5 minute emi with lateral ST segment depressions.   Troponin 202.24->271.40-> 249.46  Given stress test findings, congestion with elevated cardiac enzymes, recommend coronary angiography.        T(C): 36.6 (06-12-23 @ 14:19), Max: 37.1 (06-11-23 @ 21:06)  HR: 69 (06-12-23 @ 14:19) (58 - 69)  BP: 160/86 (06-12-23 @ 14:19) (131/69 - 160/86)  RR: 16 (06-12-23 @ 14:19) (16 - 19)  SpO2: 96% (06-12-23 @ 14:19) (96% - 96%)  Wt(kg): --    PHYSICAL EXAM:  Neurologic: Non-focal, AxOx3.  No neuro deficits  Vascular: Peripheral pulses palpable 2+ bilaterally  Procedure Site: RT. groin perclose closure device site benign soft no bleeding no hematoma +1 PP    12 lead EKG:  	    LABS:	 	                        13.4   4.33  )-----------( 214      ( 12 Jun 2023 07:19 )             39.2   06-12    140  |  109<H>  |  14  ----------------------------<  110<H>  4.5   |  29  |  0.96    Ca    8.5      12 Jun 2023 07:19    PROCEDURE RESULTS:    ASSESSMENT/PLAN:   68 YOM, healthy, no med problems neg fam hx comes c/o CPx1-2 weeks. He has a systolic murmur 2 right intercostal space III/VI. Admit for UA r/o AS.   Pt. was scheduled to have a stress test during stress test pt. developed chest pain at the 5 minute emi with lateral ST segment depressions.   Troponin 202.24->271.40-> 249.46  Given stress test findings, congestion with elevated cardiac enzymes, recommend coronary angiography.    S/P [facility] S/P ARNULFO   	  -Continue telemetry monitoring   -VS, labs, diet, activity as per PCI orders  -IV hydration  -Encourage PO fluids  -ASA 81mg  -Plavix 75mg  -Lisinopril 5mg   -Toprol XL 50mg  -Lipitor 40mg 10mg   -Pt. qualifies for cardiac rehab, educational material provided to patient pt. declines at this time will re-consider in the future   -Sedation instructions reviewed with patient   -Plan of care D/W pt. and MD  -Discussed therapeutic lifestyle changes to reduce risk factors such as following a cardiac diet, weight loss, maintaining a healthy weight, exercise, smoking cessation, medication compliance, and regular follow-up  with MD to know our numbers (BP, cholesterol, weight, and glucose  -If pt. remains stable overnight possible D/C in AM  - Follow-up AM labs/EKG/site check  -Follow-up with attending/cardiologist Nurse Practitioner Progress note:     HPI:  68 YOM, healthy, no med problems neg fam hx comes c/o CPx1-2 weeks. He is been experiencing CP mostly effort related and thought the pain could be related to his " pinched nerve" and plus he was told he has a great heart and good LDL ( I looked at ht his labs LDL 1 year ago was 154). He doesn't smoke; this am he woke up and went to use bathroom at 0400, upon returning he started having CP so he decided it was time to come to ED. He has a systolic murmur 2 right intercostal space III/VI. Admit for UA r/o AS.   Pt. was scheduled to have a stress test during stress test pt. developed chest pain at the 5 minute emi with lateral ST segment depressions.   Troponin 202.24->271.40-> 249.46  Given stress test findings, congestion with elevated cardiac enzymes, recommend coronary angiography.        T(C): 36.6 (06-12-23 @ 14:19), Max: 37.1 (06-11-23 @ 21:06)  HR: 69 (06-12-23 @ 14:19) (58 - 69)  BP: 160/86 (06-12-23 @ 14:19) (131/69 - 160/86)  RR: 16 (06-12-23 @ 14:19) (16 - 19)  SpO2: 96% (06-12-23 @ 14:19) (96% - 96%)  Wt(kg): --    PHYSICAL EXAM:  Neurologic: Non-focal, AxOx3.  No neuro deficits  Vascular: Peripheral pulses palpable 2+ bilaterally  Procedure Site: RT. groin perclose closure device site benign soft no bleeding no hematoma +1 PP    12 lead EKG:  	    LABS:	 	                        13.4   4.33  )-----------( 214      ( 12 Jun 2023 07:19 )             39.2   06-12    140  |  109<H>  |  14  ----------------------------<  110<H>  4.5   |  29  |  0.96    Ca    8.5      12 Jun 2023 07:19    PROCEDURE RESULTS:  S/P [facility] S/P ARNULFO to left main     ASSESSMENT/PLAN:   68 YOM, healthy, no med problems neg fam hx comes c/o CPx1-2 weeks. He has a systolic murmur 2 right intercostal space III/VI. Admit for UA r/o AS.   Pt. was scheduled to have a stress test during stress test pt. developed chest pain at the 5 minute emi with lateral ST segment depressions.   Troponin 202.24->271.40-> 249.46  Given stress test findings, congestion with elevated cardiac enzymes, recommend coronary angiography.    S/P [facility] S/P ARNULFO   	  -Continue telemetry monitoring   -VS, labs, diet, activity as per PCI orders  -IV hydration  -Encourage PO fluids  -ASA 81mg  -Plavix 75mg  -Lisinopril 5mg   -Toprol XL 50mg  -Lipitor 40mg 10mg   -Pt. qualifies for cardiac rehab, educational material provided to patient pt. declines at this time will re-consider in the future   -Sedation instructions reviewed with patient   -Plan of care D/W pt. and MD  -Discussed therapeutic lifestyle changes to reduce risk factors such as following a cardiac diet, weight loss, maintaining a healthy weight, exercise, smoking cessation, medication compliance, and regular follow-up  with MD to know our numbers (BP, cholesterol, weight, and glucose  -If pt. remains stable overnight possible D/C in AM  - Follow-up AM labs/EKG/site check  -Follow-up with attending/cardiologist

## 2023-06-12 NOTE — ASU PATIENT PROFILE, ADULT - FALL HARM RISK - UNIVERSAL INTERVENTIONS
Bed in lowest position, wheels locked, appropriate side rails in place/Call bell, personal items and telephone in reach/Instruct patient to call for assistance before getting out of bed or chair/Non-slip footwear when patient is out of bed/[place] to call system/Physically safe environment - no spills, clutter or unnecessary equipment/Purposeful Proactive Rounding/Room/bathroom lighting operational, light cord in reach

## 2023-06-12 NOTE — PROGRESS NOTE ADULT - SUBJECTIVE AND OBJECTIVE BOX
Exercise Nuclear Stress Test Performed  Developed chest pain at the 5 minute emi with lateral ST segment depressions.   Nuclear Imaging report to follow

## 2023-06-12 NOTE — PROGRESS NOTE ADULT - SUBJECTIVE AND OBJECTIVE BOX
Chief Complaint: Chest discomfort    Interval Hx: Patient underwent stress test this AM. Positive for reversible perfusion defects in the apical, anterior and septal walls with mildly reduced contractility and wall thickening post stress suggestive of moderate to severe ischemia. Reassuringly he is without active chest pain but he does require further ischemic evaluation. for cardiac cath this afternoon. He remains on IV heparin, statin.     ROS: Multi system review is comprehensively negative x 10 systems except as above    Vitals:  T(F): 97.8 (12 Jun 2023 14:19), Max: 98.8 (11 Jun 2023 21:06)  HR: 69 (12 Jun 2023 14:19) (58 - 69)  BP: 160/86 (12 Jun 2023 14:19) (131/69 - 160/86)  RR: 16 (12 Jun 2023 14:19) (16 - 19)  SpO2: 96% (12 Jun 2023 14:19) (96% - 96%) on room air    Exam:  Gen: No acute distress  HEENT: NCAT PERRL EOMI MMM  Neck: Supple, no JVD, no LAD, no thyromegaly, no C spine tenderness  Chest: Normal resp effort, lungs CTA B/L  CVS: s1 s2 normal, RRR  Abd: +BS, soft NT ND  Ext: No edema or calf tenderness  Skin: Warm, dry  Neuro: A+Ox3, no gross deficits  Mood: Calm, pleasant    Labs:               13.4   4.33  )---------( 214                   39.2       140  |  109  |  14  ---------------------<  110  4.5   |   29   |  0.96    Ca    8.5      12 Jun 2023 07:19    PTT 50s    TPro  6.9  /  Alb  3.3  /  TBili  0.4  /  DBili  x   /  AST  13  /  ALT  22  /  AlkPhos  65      Troponin 202 --> 271 --> 249   proBNP 97    A1c 5.7      Imaging:  CXR 6/10: The lungs are clear without discrete infiltrate.  There is no pulmonary vascular congestion.  There are no pleural effusions. The heart and mediastinal contours are within normal limits.  The trachea is midline. The osseous structures are intact.    Cardiac Testing:  Tele 6/11: SR, rate WNL, no events, no ectopy    TTE 6/10:  The left ventricle is normal in size, wall thickness, wall motion and contractility as seen in limited views. Estimated left ventricular ejection fraction is 60-65 %. Mild (1+) mitral regurgitation. Mild (1+) tricuspid valve regurgitation. Mild dilatation of the aortic arch.    EKG 6/10: Rate 70, NSR, questionable tiny q in V4-V6    Meds:  MEDICATIONS  (STANDING):  aspirin  chewable 81 milliGRAM(s) Oral daily  atorvastatin 40 milliGRAM(s) Oral at bedtime  heparin  Infusion.  Unit(s)/Hr (9.5 mL/Hr) IV Continuous <Continuous>  sodium chloride 0.9% Bolus 250 milliLiter(s) IV Bolus once    MEDICATIONS  (PRN):  acetaminophen     Tablet .. 650 milliGRAM(s) Oral every 6 hours PRN Temp greater or equal to 38C (100.4F), Mild Pain (1 - 3)  aluminum hydroxide/magnesium hydroxide/simethicone Suspension 30 milliLiter(s) Oral every 4 hours PRN Dyspepsia  heparin   Injectable 4700 Unit(s) IV Push every 6 hours PRN For aPTT less than 40  melatonin 3 milliGRAM(s) Oral at bedtime PRN Insomnia  ondansetron Injectable 4 milliGRAM(s) IV Push every 8 hours PRN Nausea and/or Vomiting

## 2023-06-12 NOTE — CHART NOTE - NSCHARTNOTEFT_GEN_A_CORE
Pt is s/p successful LM stent placement. Pt was seen at the bedside, denies chest pain/ pressure, sob, palpitation, light handedness or dizziness, denies Rt arm pain, numbness or tingling.  VSS, Rt groin access site with dry dressing - no hematoma or bleeding, +2 pedal pulses    Cath team will follow in am

## 2023-06-12 NOTE — PACU DISCHARGE NOTE - COMMENTS
Patient is awake and alert. Speech clear. Right groin site soft. No bleeding. Report to Chio ESCOBAR. Patient transported back to  accompanied by RN and member of transport team. Telemetry monitor in place.

## 2023-06-13 ENCOUNTER — TRANSCRIPTION ENCOUNTER (OUTPATIENT)
Age: 68
End: 2023-06-13

## 2023-06-13 VITALS
TEMPERATURE: 98 F | OXYGEN SATURATION: 96 % | DIASTOLIC BLOOD PRESSURE: 61 MMHG | SYSTOLIC BLOOD PRESSURE: 144 MMHG | HEART RATE: 59 BPM | RESPIRATION RATE: 18 BRPM

## 2023-06-13 LAB
ANION GAP SERPL CALC-SCNC: 1 MMOL/L — LOW (ref 5–17)
APTT BLD: 27.9 SEC — SIGNIFICANT CHANGE UP (ref 27.5–35.5)
BASOPHILS # BLD AUTO: 0.03 K/UL — SIGNIFICANT CHANGE UP (ref 0–0.2)
BASOPHILS NFR BLD AUTO: 0.5 % — SIGNIFICANT CHANGE UP (ref 0–2)
BUN SERPL-MCNC: 13 MG/DL — SIGNIFICANT CHANGE UP (ref 7–23)
CALCIUM SERPL-MCNC: 8.5 MG/DL — SIGNIFICANT CHANGE UP (ref 8.5–10.1)
CHLORIDE SERPL-SCNC: 111 MMOL/L — HIGH (ref 96–108)
CO2 SERPL-SCNC: 30 MMOL/L — SIGNIFICANT CHANGE UP (ref 22–31)
CREAT SERPL-MCNC: 0.92 MG/DL — SIGNIFICANT CHANGE UP (ref 0.5–1.3)
EGFR: 91 ML/MIN/1.73M2 — SIGNIFICANT CHANGE UP
EOSINOPHIL # BLD AUTO: 0.07 K/UL — SIGNIFICANT CHANGE UP (ref 0–0.5)
EOSINOPHIL NFR BLD AUTO: 1.1 % — SIGNIFICANT CHANGE UP (ref 0–6)
GLUCOSE SERPL-MCNC: 98 MG/DL — SIGNIFICANT CHANGE UP (ref 70–99)
HCT VFR BLD CALC: 39.9 % — SIGNIFICANT CHANGE UP (ref 39–50)
HGB BLD-MCNC: 13.5 G/DL — SIGNIFICANT CHANGE UP (ref 13–17)
IMM GRANULOCYTES NFR BLD AUTO: 0.3 % — SIGNIFICANT CHANGE UP (ref 0–0.9)
LYMPHOCYTES # BLD AUTO: 1.33 K/UL — SIGNIFICANT CHANGE UP (ref 1–3.3)
LYMPHOCYTES # BLD AUTO: 20.6 % — SIGNIFICANT CHANGE UP (ref 13–44)
MCHC RBC-ENTMCNC: 32.8 PG — SIGNIFICANT CHANGE UP (ref 27–34)
MCHC RBC-ENTMCNC: 33.8 GM/DL — SIGNIFICANT CHANGE UP (ref 32–36)
MCV RBC AUTO: 97.1 FL — SIGNIFICANT CHANGE UP (ref 80–100)
MONOCYTES # BLD AUTO: 0.6 K/UL — SIGNIFICANT CHANGE UP (ref 0–0.9)
MONOCYTES NFR BLD AUTO: 9.3 % — SIGNIFICANT CHANGE UP (ref 2–14)
NEUTROPHILS # BLD AUTO: 4.42 K/UL — SIGNIFICANT CHANGE UP (ref 1.8–7.4)
NEUTROPHILS NFR BLD AUTO: 68.2 % — SIGNIFICANT CHANGE UP (ref 43–77)
PLATELET # BLD AUTO: 202 K/UL — SIGNIFICANT CHANGE UP (ref 150–400)
POTASSIUM SERPL-MCNC: 4.7 MMOL/L — SIGNIFICANT CHANGE UP (ref 3.5–5.3)
POTASSIUM SERPL-SCNC: 4.7 MMOL/L — SIGNIFICANT CHANGE UP (ref 3.5–5.3)
RBC # BLD: 4.11 M/UL — LOW (ref 4.2–5.8)
RBC # FLD: 11.9 % — SIGNIFICANT CHANGE UP (ref 10.3–14.5)
SODIUM SERPL-SCNC: 142 MMOL/L — SIGNIFICANT CHANGE UP (ref 135–145)
WBC # BLD: 6.47 K/UL — SIGNIFICANT CHANGE UP (ref 3.8–10.5)
WBC # FLD AUTO: 6.47 K/UL — SIGNIFICANT CHANGE UP (ref 3.8–10.5)

## 2023-06-13 PROCEDURE — 99239 HOSP IP/OBS DSCHRG MGMT >30: CPT

## 2023-06-13 PROCEDURE — 93010 ELECTROCARDIOGRAM REPORT: CPT

## 2023-06-13 RX ORDER — CLOPIDOGREL BISULFATE 75 MG/1
1 TABLET, FILM COATED ORAL
Qty: 30 | Refills: 0
Start: 2023-06-13

## 2023-06-13 RX ORDER — LISINOPRIL 2.5 MG/1
1 TABLET ORAL
Qty: 0 | Refills: 0 | DISCHARGE
Start: 2023-06-13

## 2023-06-13 RX ORDER — LISINOPRIL 2.5 MG/1
1 TABLET ORAL
Qty: 30 | Refills: 0
Start: 2023-06-13

## 2023-06-13 RX ORDER — ATORVASTATIN CALCIUM 80 MG/1
1 TABLET, FILM COATED ORAL
Qty: 30 | Refills: 0
Start: 2023-06-13

## 2023-06-13 RX ORDER — ASPIRIN/CALCIUM CARB/MAGNESIUM 324 MG
1 TABLET ORAL
Qty: 30 | Refills: 0
Start: 2023-06-13

## 2023-06-13 RX ORDER — METOPROLOL TARTRATE 50 MG
1 TABLET ORAL
Qty: 30 | Refills: 0
Start: 2023-06-13

## 2023-06-13 RX ADMIN — CLOPIDOGREL BISULFATE 75 MILLIGRAM(S): 75 TABLET, FILM COATED ORAL at 09:07

## 2023-06-13 RX ADMIN — Medication 50 MILLIGRAM(S): at 09:07

## 2023-06-13 RX ADMIN — Medication 81 MILLIGRAM(S): at 09:07

## 2023-06-13 RX ADMIN — LISINOPRIL 5 MILLIGRAM(S): 2.5 TABLET ORAL at 09:08

## 2023-06-13 NOTE — PROGRESS NOTE ADULT - ASSESSMENT
68 year-old man with no known PMHx, presented to the ED for further evaluation of a 1-2 week hx of intermittent chest pain, non radiating, possibly exertional, no associated complaints. In the ED, vitals WNL, exam unremarkable. Troponin modestly elevated, 202, then 271. EKG with NSR, rate WNL, q in V4-V6. CXR clear. Patient was placed on aspirin, statin, IV heparin drip and admitted to Medicine.    Chest discomfort, elevated troponin  Feeling improved. Troponin now downtrending. No events on telemetry. TTE done. Normal LV size, wall thickness, wall motion and contractility. Normal LVEF. Appreciate input from Cardiology Dr. Hirsch who advised continued aspirin, statin, IV heparin drip, ordered stress test. Completed stress nuclear today. Positive for reversible perfusion defects in the apical, anterior and septal walls with mildly reduced contractility and wall thickening post stress suggestive of moderate to severe ischemia. Thus he requires further ischemic evaluation. For cardiac cath this afternoon. He remains on IV heparin, statin.   - Continue IV heparin drip, statin  - Cardiac cath today    
Unstable angina, NSTEMI  CAD, s/p ARNULFO LM  HTN  HLD    Suggest:    DAPT  Statins  beta blockers  ACEi  Diet and lifestyle modifications suggested.  Increase exercise gradually  Cardiac rehab  Aggressive medical management and secondary prevention of CAD and its risk factors.  Discharge home today
69 yo man with no known PMHx, presented to the ED for further evaluation of a 1-2 week hx of intermittent chest pain, non radiating, possibly exertional, no associated complaints. In the ED, vitals WNL, exam unremarkable. Troponin modestly elevated, 202, then 271. EKG with NSR, rate WNL, q in V4-V6. CXR clear. Patient was placed on aspirin, statin, IV heparin drip and admitted to Medicine.    Chest discomfort, elevated troponin  Feeling improved. Troponin now downtrending. No events on telemetry. TTE done. Normal LV size, wall thickness, wall motion and contractility. Normal LVEF. Appreciate input from Cardiology Dr. Hirsch who advised continued aspirin, statin, IV heparin drip, ordered stress test for tomorrow AM. NPO > MN.   - Aspirin, statin, IV heparin drip  - NPO > MN for stress test tomorrow  - Cardiology follow up
68 YOM, healthy, no med problems neg fam hx comes c/o CPx1-2 weeks. He is been experiencing CP mostly effort related and thought the pain could be related to his " pinched nerve" and plus he was told he has a great heart and gool LDL ( I looked at ht his labs LDL 1 year ago was 154). He doesn't smoke; this am he woke up and went to use bathroom at 0400, upon returning he started having CP so he decided it was time to come to ED.     6/11/23  Troponins are slightly elevated and trending downward.   recommend a stress test in am. If normal, then discharge home for further work up of his cervical pinched nerve.     6/12/2023  Given stress test findings, congestion with elevated cardiac enzymes, recommend coronary angiography.  We will attempt to schedule later today.    Continue aspirin.  Continue statin.  Pending echo, further recommendations to follow.

## 2023-06-13 NOTE — PROGRESS NOTE ADULT - SUBJECTIVE AND OBJECTIVE BOX
68-year-old male admitted with complaints of crescendo angina for 2 weeks.  History of hyperlipidemia, noncompliant to medical follow-up and treatment.  Abnormal nuclear stress test.  95% stenosis of mid left main on cardiac catheterization.  Successful ARNULFO PCI.    Currently, no complaints of chest pain.  Feels well.  Right groin without hematoma or bleeding.  No complaints of pain in the right groin.  Hemodynamically stable.  Tolerating current medications well.      CURRENT CARDIAC WORKUP:       Echo:  < from: TTE Echo Complete w/o Contrast w/ Doppler (06.10.23 @ 11:27) >   The left ventricle is normal in size, wall thickness, wall motion and   contractility as seen in limited views.   Estimated left ventricular ejection fraction is 60-65 %.   Mild (1+) mitral regurgitation.   Mild (1+) tricuspid valve regurgitation.   Mild dilatation of the aortic arch.    Cardiac Cath:  6/12/23  ARNULFO PCI of left main      Allergies:   No Known Allergies      MEDICATIONS  (STANDING):  aspirin  chewable 81 milliGRAM(s) Oral daily  atorvastatin 40 milliGRAM(s) Oral at bedtime  clopidogrel Tablet 75 milliGRAM(s) Oral daily  lisinopril 5 milliGRAM(s) Oral daily  metoprolol succinate ER 50 milliGRAM(s) Oral daily  sodium chloride 0.9%. 1000 milliLiter(s) (240 mL/Hr) IV Continuous <Continuous>    MEDICATIONS  (PRN):  acetaminophen     Tablet .. 650 milliGRAM(s) Oral every 6 hours PRN Temp greater or equal to 38C (100.4F), Mild Pain (1 - 3)  aluminum hydroxide/magnesium hydroxide/simethicone Suspension 30 milliLiter(s) Oral every 4 hours PRN Dyspepsia  melatonin 3 milliGRAM(s) Oral at bedtime PRN Insomnia  ondansetron Injectable 4 milliGRAM(s) IV Push every 8 hours PRN Nausea and/or Vomiting      Vital Signs Last 24 Hrs  T(C): 36.8 (12 Jun 2023 20:00), Max: 36.8 (12 Jun 2023 20:00)  T(F): 98.2 (12 Jun 2023 20:00), Max: 98.2 (12 Jun 2023 20:00)  HR: 67 (12 Jun 2023 20:00) (58 - 69)  BP: 146/83 (12 Jun 2023 20:00) (131/69 - 162/85)  BP(mean): 84 (12 Jun 2023 10:46) (84 - 84)  RR: 16 (12 Jun 2023 20:00) (16 - 18)  SpO2: 98% (12 Jun 2023 20:00) (96% - 98%)    Parameters below as of 12 Jun 2023 20:00  Patient On (Oxygen Delivery Method): room air        PHYSICAL EXAM:    General:                Comfortable, AAO X 3, in no distress.  HEENT:                  Unremarkable.   Neck:                     Supple, no adenopathy, no thyromegaly, no JVD, no bruit.  Chest:                    Clear, B/L symmetric air entry, no tachypnea  Heart:                     S1, S2 normal, no gallop, no murmur.  Abdomen:              Soft, non-tender, bowel sounds active. No palpable masses.  Extremities:           no cyanosis, no edema.   Neurologic:            Grossly nonfocal.       TELEMETRY:  Normal sinus rhythm with no tachy or melanie events     ECG:  Sinus bradycardia, first degree AV block, no ST T changes of ischemia or infarction.     LABS:                        13.5   6.47  )-----------( 202      ( 13 Jun 2023 05:55 )             39.9     06-13    142  |  111<H>  |  13  ----------------------------<  98  4.7   |  30  |  0.92    Ca    8.5      13 Jun 2023 05:55      Lipid Panel 06-11 @ 07:07  Chl 196  HDL 48    Trg 87      06-10 @ 18:16  Trop-I 249.46    06-10 @ 13:18  Trop-I 271.40    06-10 @ 08:44  Trop-I 202.24      Pro BNP   06-10 @ 08:44  97      PTT - ( 13 Jun 2023 05:55 )  PTT:27.9 sec      RADIOLOGY & ADDITIONAL STUDIES:    < from: Xray Chest 1 View- PORTABLE-Urgent (06.10.23 @ 08:53) >    IMPRESSION:  No acute pulmonary pathology.

## 2023-06-13 NOTE — DISCHARGE NOTE PROVIDER - NSDCMRMEDTOKEN_GEN_ALL_CORE_FT
aspirin 81 mg oral tablet, chewable: 1 tab(s) orally once a day  atorvastatin 40 mg oral tablet: 1 tab(s) orally once a day (at bedtime)  clopidogrel 75 mg oral tablet: 1 tab(s) orally once a day  lisinopril 5 mg oral tablet: 1 tab(s) orally once a day  metoprolol succinate 50 mg oral tablet, extended release: 1 tab(s) orally once a day

## 2023-06-13 NOTE — DISCHARGE NOTE PROVIDER - NSDCCPCAREPLAN_GEN_ALL_CORE_FT
PRINCIPAL DISCHARGE DIAGNOSIS  Diagnosis: NSTEMI (non-ST elevation myocardial infarction)  Assessment and Plan of Treatment: You presented to the hospital with chest complaints, found to have unstable angina with evolving NSTEMI (heart attack). You underwent left heart catheterization for coronary angiogram and you had severe stenosis in the left main coronary after, treated with drug eluting stent placement. You are now doing well. Stable for discharge home. Please continue Aspirin, Plavix, metoprolol and lisinopril. Diet and lifestyle modifications suggested. Increase exercise gradually. Cardiac rehab. Outpatient follow up with Cardiology (Dr Sewell, Dr Smith, Dr Phoenix) and your primary care provider.

## 2023-06-13 NOTE — PROGRESS NOTE ADULT - SUBJECTIVE AND OBJECTIVE BOX
Nurse Practitioner Progress note:    68 YOM, healthy, no med problems neg fam hx comes c/o CPx1-2 weeks. He is been experiencing CP mostly effort related and thought the pain could be related to his " pinched nerve".   + Trop on admission  developed chest pain 5 minute post stress test  with lateral ST segment depressions.   s/p PCI on 6/12/23. Denies chest pain, shortness of breath, dizziness or palpitations    PHYSICAL EXAM:  Constitutional: NAD  Neuro: Alert and oriented x 3  Speech clear No focal deficits  Respiratory: CTAB  Cardiovascular: S1 and S2, RRR,   Gastrointestinal: BS+, soft, NT/ND  Extremities: No clubbing cyanosis or edema No varicosities  Vascular: 2+ peripheral pulses  Psychiatric: Normal mood, normal affect  Musculoskeletal: 5/5 strength b/l upper and lower extremities  Right groin procedure site CDI.  no bleeding, no hematoma, site soft, non tender, positive pedal pulses bilaterally    T(C): 36.5 (06-13-23 @ 07:44), Max: 36.8 (06-12-23 @ 20:00)  HR: 59 (06-13-23 @ 07:44) (58 - 69)  BP: 144/61 (06-13-23 @ 07:44) (131/69 - 162/85)  RR: 18 (06-13-23 @ 07:44) (16 - 18)  SpO2: 96% (06-13-23 @ 07:44) (96% - 98%)    Tele: NSR  EKG: NSR    CBC Full  -  ( 13 Jun 2023 05:55 )  WBC Count : 6.47 K/uL  RBC Count : 4.11 M/uL  Hemoglobin : 13.5 g/dL  Hematocrit : 39.9 %  Platelet Count - Automated : 202 K/uL  Mean Cell Volume : 97.1 fl  Mean Cell Hemoglobin : 32.8 pg  Mean Cell Hemoglobin Concentration : 33.8 gm/dL  Auto Neutrophil # : 4.42 K/uL  Auto Lymphocyte # : 1.33 K/uL  Auto Monocyte # : 0.60 K/uL  Auto Eosinophil # : 0.07 K/uL  Auto Basophil # : 0.03 K/uL  Auto Neutrophil % : 68.2 %  Auto Lymphocyte % : 20.6 %  Auto Monocyte % : 9.3 %  Auto Eosinophil % : 1.1 %  Auto Basophil % : 0.5 %    06-13    142  |  111<H>  |  13  ----------------------------<  98  4.7   |  30  |  0.92    Ca    8.5      13 Jun 2023 05:55      MEDICATIONS  (STANDING):  aspirin  chewable 81 milliGRAM(s) Oral daily  atorvastatin 40 milliGRAM(s) Oral at bedtime  clopidogrel Tablet 75 milliGRAM(s) Oral daily  lisinopril 5 milliGRAM(s) Oral daily  metoprolol succinate ER 50 milliGRAM(s) Oral daily  sodium chloride 0.9%. 1000 milliLiter(s) (240 mL/Hr) IV Continuous <Continuous>    MEDICATIONS  (PRN):  acetaminophen     Tablet .. 650 milliGRAM(s) Oral every 6 hours PRN Temp greater or equal to 38C (100.4F), Mild Pain (1 - 3)  aluminum hydroxide/magnesium hydroxide/simethicone Suspension 30 milliLiter(s) Oral every 4 hours PRN Dyspepsia  melatonin 3 milliGRAM(s) Oral at bedtime PRN Insomnia  ondansetron Injectable 4 milliGRAM(s) IV Push every 8 hours PRN Nausea and/or Vomiting    HPI: 68 YOM, healthy, no med problems neg fam hx comes c/o CPx1-2 weeks. He is been experiencing CP mostly effort related and thought the pain could be related to his " pinched nerve" and plus he was told he has a great heart and gool LDL ( I looked at ht his labs LDL 1 year ago was 154). He doesn't smoke; this am he woke up and went to use bathroom at 0400, upon returning he started having CP so he decided it was time to come to ED. He has a systolic murmur 2 right intercostal space III/VI. Admit for UA r/o AS. (10 Mata 2023 10:35)  Troponin 202.24->271.40-> 249.46  Pt developed chest pain 5 minute post stress test  with lateral ST segment depressions.   s/p PCI of LM  ASSESSMENT/PLAN:    Coronary artery disease/NSTEMI  -Aspirin 81mg PO daily  -Plavix 75mg PO daily  -atorvastatin 40mg PO QHS   -Continue BB, ACEI  -Plan of care discussed with patient and MD  -Ambulate   -Follow-up with attending MD within 1 week  -Discussed therapeutic lifestyle changes to reduce risk factors such as following a cardiac diet, weight loss, maintaining a healthy weight, exercise, smoking cessation, medication compliance, and regular follow-up  with MD   -Cardiac rehab recommended to pt who refused

## 2023-06-13 NOTE — DISCHARGE NOTE NURSING/CASE MANAGEMENT/SOCIAL WORK - NSPROMEDSBROUGHTTOHOSP_GEN_A_NUR
no Communicate Risk of Fall with Harm to all staff/Reinforce activity limits and safety measures with patient and family/Tailored Fall Risk Interventions/Visual Cue: Yellow wristband and red socks/Bed in lowest position, wheels locked, appropriate side rails in place/Call bell, personal items and telephone in reach/Instruct patient to call for assistance before getting out of bed or chair/Non-slip footwear when patient is out of bed/[place] to call system/Physically safe environment - no spills, clutter or unnecessary equipment/Purposeful Proactive Rounding/Room/bathroom lighting operational, light cord in reach Yes

## 2023-06-13 NOTE — DISCHARGE NOTE PROVIDER - NSDCCAREPROVSEEN_GEN_ALL_CORE_FT
Aida Sewell (Cardiology)  Isa Branch (Hospital Medicine)  Jacob Ellis ([facility] Medicine)

## 2023-06-13 NOTE — DISCHARGE NOTE NURSING/CASE MANAGEMENT/SOCIAL WORK - PATIENT PORTAL LINK FT
You can access the FollowMyHealth Patient Portal offered by [facility] by registering at the following website: [email or web address]. By joining [facility]’s FollowMyHealth portal, you will also be able to view your health information using other applications (apps) compatible with our system.

## 2023-06-13 NOTE — DISCHARGE NOTE PROVIDER - PROVIDER TOKENS
PROVIDER:[TOKEN:[[id number]],FOLLOWUP:[2 weeks]],PROVIDER:[TOKEN:[[id number]],FOLLOWUP:[1 week]]

## 2023-06-13 NOTE — DISCHARGE NOTE PROVIDER - CARE PROVIDER_API CALL
Aida Sewell  Cardiology  [address] [facility] [place] [place] [address]  Phone: [phone]  Fax: [phone]  Follow Up Time: 2 weeks    Antonio Bautista  Internal Medicine  [address] [place] [address]  Phone: [phone]  Fax: [phone]  Follow Up Time: 1 week

## 2023-06-13 NOTE — DISCHARGE NOTE PROVIDER - HOSPITAL COURSE
67 year-old man with no known PMHx, presented to the ED for further evaluation of a 1-2 week hx of intermittent chest pain, non radiating, possibly exertional, no associated complaints. In the ED, vitals WNL, exam unremarkable. Troponin modestly elevated, 202, then 271. EKG with NSR, rate WNL, q in V4-V6. CXR clear. Patient was placed on aspirin, statin, IV heparin drip and admitted to Medicine.    Unstable angina, NSTEMI  Dx with CAD yesterday on L heart cath s/p ARNULFO to L main coronary artery. Feeling improved. No events on telemetry. TTE done. Normal LV size, wall thickness, wall motion and contractility. Normal LVEF. Appreciate input from Cardiology. Aspirin, Plavix, metoprolol and lisinopril. Diet and lifestyle modifications suggested.  Increase exercise gradually. Cardiac rehab. Outpatient follow up with Cardiology and Primary Care.     Discharge Exam:  T(F): 97.7 (13 Jun 2023 07:44), Max: 98.2 (12 Jun 2023 20:00)  HR: 59 (13 Jun 2023 07:44) (59 - 69)  BP: 144/61 (13 Jun 2023 07:44) (144/61 - 162/85)  RR: 18 (13 Jun 2023 07:44) (16 - 18)  SpO2: 96% (13 Jun 2023 07:44) (96% - 98%) on room air  Gen: Comfortable  HEENT: NCAT PERRL EOMI MMM  Neck: Supple, no JVD, no LAD, no thyromegaly, no C spine tenderness  Chest: Normal resp effort, lungs CTA B/L  CVS: s1 s2 normal, RRR  Abd: +BS, soft NT ND  Ext: No edema or calf tenderness  Skin: Warm, dry  Neuro: A+Ox3, no gross deficits  Mood: Calm, pleasant    Labs:                        13.5   6.47 )-----------( 202             39.9       142  |  111 |  13  ------------------------<  98  4.7   |  30  |  0.92    Ca 8.5    TPro  6.9  /  Alb  3.3  /  TBili  0.4  /  DBili  x   /  AST  13  /  ALT  22  /  AlkPhos  65      Troponin 202 --> 271 --> 249   proBNP 97    A1c 5.7      Imaging:  CXR 6/10: The lungs are clear without discrete infiltrate.  There is no pulmonary vascular congestion.  There are no pleural effusions. The heart and mediastinal contours are within normal limits.  The trachea is midline. The osseous structures are intact.    Cardiac Testing:  L heart cath 6/12: L main disease 95% stenosis s/p ARNULFO	    Tele 6/11: SR, rate WNL, no events, no ectopy    TTE 6/10:  The left ventricle is normal in size, wall thickness, wall motion and contractility as seen in limited views. Estimated left ventricular ejection fraction is 60-65 %. Mild (1+) mitral regurgitation. Mild (1+) tricuspid valve regurgitation. Mild dilatation of the aortic arch.    EKG 6/10: Rate 70, NSR, questionable tiny q in V4-V6

## 2023-07-27 ENCOUNTER — EMERGENCY (EMERGENCY)
Facility: HOSPITAL | Age: 68
LOS: 0 days | Discharge: HOME | End: 2023-07-28
Attending: STUDENT IN AN ORGANIZED HEALTH CARE EDUCATION/TRAINING PROGRAM
Payer: MEDICARE

## 2023-07-27 VITALS — HEIGHT: 72 IN | WEIGHT: 190.04 LBS

## 2023-07-27 DIAGNOSIS — Z79.82 LONG TERM (CURRENT) USE OF ASPIRIN: ICD-10-CM

## 2023-07-27 DIAGNOSIS — S61.214A LACERATION WITHOUT FOREIGN BODY OF RIGHT RING FINGER WITHOUT DAMAGE TO NAIL, INITIAL ENCOUNTER: ICD-10-CM

## 2023-07-27 DIAGNOSIS — Z79.02 LONG TERM (CURRENT) USE OF ANTITHROMBOTICS/ANTIPLATELETS: ICD-10-CM

## 2023-07-27 DIAGNOSIS — Z23 ENCOUNTER FOR IMMUNIZATION: ICD-10-CM
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
## 2023-07-27 PROCEDURE — 12001 RPR S/N/AX/GEN/TRNK 2.5CM/<: CPT

## 2023-07-27 PROCEDURE — 90471 IMMUNIZATION ADMIN: CPT

## 2023-07-27 PROCEDURE — 99283 EMERGENCY DEPT VISIT LOW MDM: CPT | Mod: 25

## 2023-07-27 PROCEDURE — 99053 MED SERV 10PM-8AM 24 HR FAC: CPT

## 2023-07-27 PROCEDURE — 99284 EMERGENCY DEPT VISIT MOD MDM: CPT

## 2023-07-27 PROCEDURE — 90715 TDAP VACCINE 7 YRS/> IM: CPT

## 2023-07-27 RX ORDER — TETANUS TOXOID, REDUCED DIPHTHERIA TOXOID AND ACELLULAR PERTUSSIS VACCINE, ADSORBED 5; 2.5; 8; 8; 2.5 [IU]/.5ML; [IU]/.5ML; UG/.5ML; UG/.5ML; UG/.5ML
0.5 SUSPENSION INTRAMUSCULAR ONCE
Refills: 0 | Status: COMPLETED | OUTPATIENT
Start: 2023-07-27 | End: 2023-07-27

## 2023-07-27 NOTE — ED ADULT TRIAGE NOTE - CHIEF COMPLAINT QUOTE
Pt ambulatory to ED c/o finger laceration on L hand. Pt has hand wrapped in triage with bleeding controlled. Pt states the bleeding "has not stopped". Pt cut himself on ceramic pool tile. Pt on blood thinners. Pt hx of cardiac stents and NKDA. Pt Aox4 and no s/s distress.

## 2023-07-28 VITALS
RESPIRATION RATE: 18 BRPM | SYSTOLIC BLOOD PRESSURE: 114 MMHG | OXYGEN SATURATION: 100 % | DIASTOLIC BLOOD PRESSURE: 56 MMHG | HEART RATE: 66 BPM

## 2023-07-28 PROBLEM — Z78.9 OTHER SPECIFIED HEALTH STATUS: Chronic | Status: ACTIVE | Noted: 2023-06-10

## 2023-07-28 RX ADMIN — TETANUS TOXOID, REDUCED DIPHTHERIA TOXOID AND ACELLULAR PERTUSSIS VACCINE, ADSORBED 0.5 MILLILITER(S): 5; 2.5; 8; 8; 2.5 SUSPENSION INTRAMUSCULAR at 00:28

## 2023-07-28 NOTE — ED STATDOCS - OBJECTIVE STATEMENT
67-year-old male comes in because he cut his fourth digit of the right hand while wiping his hand across tile in the pool.  He is on aspirin and Plavix for heart disease.  States bleeding cannot be controlled.  Cut is superficial.  States his last tetanus shot was 15 years ago.

## 2023-07-28 NOTE — ED STATDOCS - CLINICAL SUMMARY MEDICAL DECISION MAKING FREE TEXT BOX
patient here with skin avulsion bleeding on aspirin and Plavix controlled with hemostat and pressure bandage will discharge consult given.

## 2023-07-28 NOTE — ED STATDOCS - NSFOLLOWUPINSTRUCTIONS_ED_ALL_ED_FT
Skin Tear  A skin tear is a wound in which the top layers of skin have peeled off from the deeper skin or tissues underneath. This is a common problem as people get older because the skin becomes thinner and more fragile. In addition, some medicines, such as oral corticosteroids, can lead to thinning skin if they are taken for long periods of time.    A skin tear is often repaired with tape or skin adhesive strips. Depending on the location of the wound, a bandage (dressing) may be applied over the tape or adhesive strips.    Follow these instructions at home:  Wound care      Clean the wound as told by your health care provider. You may be instructed to keep the wound dry for the first few days. If you are told to clean the wound:  Wash the wound as told by your health care provider. This may include using mild soap and water, a wound cleanser, or a salt–water (saline) solution.  If using soap, rinse the wound with water to remove all soap.  Do not rub the wound dry. Pat it gently with a clean towel or let it air-dry.  Change any dressings as told by your health care provider. This may include changing the dressing if it gets wet, gets dirty, or starts to smell bad.  Wash your hands with soap and water for at least 20 seconds before and after you change your bandage (dressing). If soap and water are not available, use hand .  Leave tape or skin adhesive strips in place. These skin closures may need to stay in place for 2 weeks or longer. If adhesive strip edges start to loosen and curl up, you may trim the loose edges. Do not remove adhesive strips completely unless your health care provider tells you to do that.  Check your wound every day for signs of infection. Check for:  Redness, swelling, or pain.  More fluid or blood.  Warmth.  Pus or a bad smell.  Do not scratch or pick at the wound.  Protect the injured area until it has healed.  Medicines    Take or apply over-the-counter and prescription medicines only as told by your health care provider.  If you were prescribed an antibiotic medicine, take or apply it as told by your health care provider. Do not stop using the antibiotic even if your condition improves.  General instructions      Keep the dressing dry as told by your health care provider.  Do not take baths, swim, use a hot tub, or do anything that puts your wound underwater until your health care provider approves. Ask your health care provider if you may take showers. You may only be allowed to take sponge baths.  Keep all follow-up visits. This is important.  Contact a health care provider if:  You have redness, swelling, or pain around your wound.  You have more fluid or blood coming from your wound.  Your wound, or the area around your wound, feels warm to the touch.  You have pus or a bad smell coming from your wound.  Get help right away if:  You have a red streak that goes away from the skin tear.  You have a fever and chills, and your symptoms suddenly get worse.  Summary  A skin tear is a wound in which the top layers of skin have peeled off from the deeper skin or tissues underneath.  A skin tear is often repaired with tape or skin adhesive strips, and a bandage (dressing) may be applied over the tape or the adhesive strips.  Change any dressings as told by your health care provider.  Take or apply over-the-counter and prescription medicines only as told by your health care provider.  Contact a health care provider if you have signs of infection.  This information is not intended to replace advice given to you by your health care provider. Make sure you discuss any questions you have with your health care provider.    Document Revised: 03/24/2021 Document Reviewed: 03/24/2021

## 2023-07-28 NOTE — ED STATDOCS - SKIN, MLM
skin normal color for race, warm, dry and intact.  Superficial  skin avulsion to the medial aspect of the fourth digit of his right hand.  It is continuously oozing venous blood.

## 2023-07-28 NOTE — ED STATDOCS - PATIENT PORTAL LINK FT
You can access the FollowMyHealth Patient Portal offered by [facility] by registering at the following website: [email or web address]. By joining [facility]’s FollowMyHealth portal, you will also be able to view your health information using other applications (apps) compatible with our system.

## 2024-05-01 ENCOUNTER — APPOINTMENT (OUTPATIENT)
Dept: SURGERY | Facility: CLINIC | Age: 69
End: 2024-05-01
Payer: MEDICARE

## 2024-05-01 ENCOUNTER — NON-APPOINTMENT (OUTPATIENT)
Age: 69
End: 2024-05-01

## 2024-05-01 VITALS
HEIGHT: 71 IN | DIASTOLIC BLOOD PRESSURE: 70 MMHG | WEIGHT: 170 LBS | SYSTOLIC BLOOD PRESSURE: 108 MMHG | OXYGEN SATURATION: 99 % | BODY MASS INDEX: 23.8 KG/M2 | HEART RATE: 65 BPM

## 2024-05-01 DIAGNOSIS — K43.9 VENTRAL HERNIA W/OUT OBSTRUCTION OR GANGRENE: ICD-10-CM

## 2024-05-01 PROCEDURE — 99203 OFFICE O/P NEW LOW 30 MIN: CPT

## 2024-05-01 NOTE — PHYSICAL EXAM
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

## 2024-10-10 ENCOUNTER — NON-APPOINTMENT (OUTPATIENT)
Age: 69
End: 2024-10-10